# Patient Record
(demographics unavailable — no encounter records)

---

## 2025-02-19 NOTE — HISTORY OF PRESENT ILLNESS
[FreeTextEntry1] : Pt came with mother - was admitted to the hospital with low sodium level (8/20-8/22). Currently on desmopressin 0.2 mg -  one tablet in am and 2 tablets HS.  Mother is concerned about what to do in the future not to miss another episode of hyponatremia. Pt feels well now. States was not using insulin in the hospital, and his BG was normal. Currently on Basaglar 18 Units qHS and Humalog 4 units ac tid; metformin and Trulicity.  9/9/19 MK Came for f/u on DM and recent adjustment of desmopressin related to low sodium. States that urinate excessively during the day time, and wakes up at night q 2 hours. POC BG - 81 mg/dl  CGM uploaded, report analyzed -  BG average 103 mg/dl; near 10% are hypoglycemia; 90% of time - in range. Of note: pt is sneezing, coughing and feeling dizzy. BG re-checked - 75 mg/dl after breakfast. Treated with apple juice - felt better.  1/5/22  Pt c/o increased seizures - tomorrow has an appt with his neurologist. - DM - BG is ranging 75- 97 mg/dl; Currently on metformin 500 mg bid; c/o weight loss, particularly loss of  muscles;  Current weigh 185 lb (during appointment). Lost 8 more lbs since June 2021. - DI - c/o frequent urination, asked to add desmopressin tablet in am. another complain - back pain - has appointment with specialist. Can't exercise feels weak.  2/19/25 MK F/U on DI and DM. States feels very fatigued.  DM - is on no meds currently. POC A1C - 5.9% POC BG - 122 mg/dl Lost 6 more lb since last visit.  DI - - wakes up 2-3 times during the night to urinate. Is on desmopressin - states is adherent.  Hypothyroidism - is on levothyroxine - 150 mcg.  Had vagal stimulator for epilepsy implanted on September 3,2024. Still have seizures. New medication was added - Motpoly  mg am nd 200 mg HS.  No other changes or complains.

## 2025-02-19 NOTE — ASSESSMENT
[Carbohydrate Consistent Diet] : carbohydrate consistent diet [Importance of Diet and Exercise] : importance of diet and exercise to improve glycemic control, achieve weight loss and improve cardiovascular health [Weight Loss] : weight loss [FreeTextEntry1] : DM - well controlled with diet, no meds necessary at this time. DI - labs today. Hypothyroidism - will re-check TFTs. I'll discuss the results with Dr. Garland, and call the pt; will send refills based on report. F/U with Dr. Garland in 3-4 months or sooner PRN.

## 2025-02-19 NOTE — PHYSICAL EXAM
[Alert] : alert [No Acute Distress] : no acute distress [No Respiratory Distress] : no respiratory distress [No Accessory Muscle Use] : no accessory muscle use [Normal Rate and Effort] : normal respiratory rate and effort [Normal Rate] : heart rate was normal [No Edema] : no peripheral edema [Spine Straight] : spine straight [No Stigmata of Cushings Syndrome] : no stigmata of Cushings Syndrome [No Clubbing, Cyanosis] : no clubbing  or cyanosis of the fingernails [No Involuntary Movements] : no involuntary movements were seen [No Joint Swelling] : no joint swelling seen [No Rash] : no rash [No Tremors] : no tremors [Oriented x3] : oriented to person, place, and time [de-identified] : legally blind